# Patient Record
Sex: FEMALE | Race: WHITE
[De-identification: names, ages, dates, MRNs, and addresses within clinical notes are randomized per-mention and may not be internally consistent; named-entity substitution may affect disease eponyms.]

---

## 2019-11-15 ENCOUNTER — HOSPITAL ENCOUNTER (OUTPATIENT)
Dept: HOSPITAL 95 - ORSCSDS | Age: 69
Discharge: HOME | End: 2019-11-15
Attending: INTERNAL MEDICINE
Payer: MEDICARE

## 2019-11-15 VITALS — BODY MASS INDEX: 22.66 KG/M2 | WEIGHT: 132.72 LBS | HEIGHT: 64.02 IN

## 2019-11-15 DIAGNOSIS — K21.9: ICD-10-CM

## 2019-11-15 DIAGNOSIS — E03.9: ICD-10-CM

## 2019-11-15 DIAGNOSIS — I10: ICD-10-CM

## 2019-11-15 DIAGNOSIS — Z12.11: Primary | ICD-10-CM

## 2019-11-15 DIAGNOSIS — D12.0: ICD-10-CM

## 2019-11-15 DIAGNOSIS — K57.30: ICD-10-CM

## 2019-11-15 DIAGNOSIS — K64.8: ICD-10-CM

## 2019-11-15 DIAGNOSIS — Z79.899: ICD-10-CM

## 2019-11-15 PROCEDURE — 0DBH8ZX EXCISION OF CECUM, VIA NATURAL OR ARTIFICIAL OPENING ENDOSCOPIC, DIAGNOSTIC: ICD-10-PCS | Performed by: INTERNAL MEDICINE

## 2019-11-15 NOTE — NUR
11/15/19 1143 Kimberly Cordova
LATE ENTRY: PT'S IV INFILTRATED, RN REMOVED IV. PREASSURE PLACED ON
THE RIGHT HAND. IV ATTEMPT ON LEFT HAND AND INFILTRATED. IV ATTEMPT ON
LEFT AC AND IS PATENT.

## 2021-05-06 ENCOUNTER — HOSPITAL ENCOUNTER (OUTPATIENT)
Dept: HOSPITAL 95 - ORSCMMR | Age: 71
Discharge: HOME | End: 2021-05-06
Attending: OBSTETRICS & GYNECOLOGY
Payer: MEDICARE

## 2021-05-06 VITALS — HEIGHT: 64.02 IN | WEIGHT: 137.13 LBS | BODY MASS INDEX: 23.41 KG/M2

## 2021-05-06 DIAGNOSIS — K66.0: ICD-10-CM

## 2021-05-06 DIAGNOSIS — M79.7: ICD-10-CM

## 2021-05-06 DIAGNOSIS — K21.9: ICD-10-CM

## 2021-05-06 DIAGNOSIS — E03.9: ICD-10-CM

## 2021-05-06 DIAGNOSIS — Z79.899: ICD-10-CM

## 2021-05-06 DIAGNOSIS — D27.0: Primary | ICD-10-CM

## 2021-05-06 DIAGNOSIS — I10: ICD-10-CM

## 2021-05-06 PROCEDURE — 0UT54ZZ RESECTION OF RIGHT FALLOPIAN TUBE, PERCUTANEOUS ENDOSCOPIC APPROACH: ICD-10-PCS | Performed by: OBSTETRICS & GYNECOLOGY

## 2021-05-06 PROCEDURE — 0UT04ZZ RESECTION OF RIGHT OVARY, PERCUTANEOUS ENDOSCOPIC APPROACH: ICD-10-PCS | Performed by: OBSTETRICS & GYNECOLOGY

## 2021-05-06 NOTE — NUR
Ambulatory in Day Surgery
History, Chart, Medications and Allergies reviewed before start of
procedure. Lungs clear T/O to Auscultation.
Patient confirms NPO status and agrees with scheduled surgery.
Pre-Op teaching done. Pt verbalizes understanding.
Patient States Post-Procedure ride home has been arranged.

## 2021-05-06 NOTE — NUR
Dressing to procedure site clean, dry, intact with no visible
drainage, swelling, erythema or bruising noted.
Discharge instructions reviewed with patient. Patient verbalizes understanding.
Copy given to patient to take home.
Patient States Post-Procedure ride home has been arranged.
Discharged via wheelchair to private car for ride home.